# Patient Record
Sex: MALE | Race: ASIAN | URBAN - METROPOLITAN AREA
[De-identification: names, ages, dates, MRNs, and addresses within clinical notes are randomized per-mention and may not be internally consistent; named-entity substitution may affect disease eponyms.]

---

## 2017-03-09 ENCOUNTER — TELEPHONE (OUTPATIENT)
Dept: FAMILY MEDICINE | Facility: CLINIC | Age: 57
End: 2017-03-09

## 2017-03-09 NOTE — LETTER
[address] [address]  [phone]  Dept: [phone]      March 9, 2017      True Fatimah  [address]   [facility] [address]    Dear Rajesh Sheridan,     At [facility] we care about your health and are committed to providing quality patient care.    Which includes staying current on preventive cancer screenings.  You can increase your chances of finding and treating cancers through regular screenings.      Our records indicate you may be due for the following preventive screening(s):    Colonoscopy    Colonoscopy is recommended every ten years for everyone age 50 and older. We strongly urge our patient's to consider having a colonoscopy done, which is the best screening test available and only needs to be done every 10 years if normal. If you are unwilling or unable to have a colonoscopy then we recommend the annual stool testing for blood. This test is called a FIT test and it looks for blood in the stool.     To schedule an appointment or discuss this screening further, you may contact us by phone at the [facility] at [phone] or online through the patient [email or web address] @ [email or web address]    If you have had any of the screenings listed above at another facility, please call us so that we may update your chart.      Your partners in health,      Quality Committee at [facility]

## 2017-05-18 ENCOUNTER — TELEPHONE (OUTPATIENT)
Dept: FAMILY MEDICINE | Facility: CLINIC | Age: 57
End: 2017-05-18

## 2017-05-18 NOTE — TELEPHONE ENCOUNTER
Panel Management Review      3/9/2017    Fail List measure: Colon      Patient is due/failing the following:   COLONOSCOPY    Action needed:   Needs to schedule a colonoscopy    Type of outreach:    Sent letter.    Questions for provider review:    None                                                                                                                                    Shannan Estevez

## 2017-05-18 NOTE — LETTER
[facility]  [address] [place] [address]  [phone]      May 18, 2017      True Fatimah  [address]   [place] [facility] [address]              Dear True,    Our records show that you have not yet completed your colonoscopy for your colon cancer screening.  Please call to schedule your colonoscopy.    If you have already completed the colonoscopy please contact us so we can obtain the records and update your chart.    Sincerely,    Ciriol Goins MD                Procedure/Referral: PROCEDURE ONLY - COLONOSCOPY - Reason for procedure: Screening  N: [facility] [phone]

## 2017-05-31 ENCOUNTER — OFFICE VISIT (OUTPATIENT)
Dept: FAMILY MEDICINE | Facility: CLINIC | Age: 57
End: 2017-05-31
Payer: COMMERCIAL

## 2017-05-31 VITALS
HEIGHT: 63 IN | BODY MASS INDEX: 28.35 KG/M2 | SYSTOLIC BLOOD PRESSURE: 130 MMHG | OXYGEN SATURATION: 99 % | TEMPERATURE: 98.4 F | WEIGHT: 160 LBS | DIASTOLIC BLOOD PRESSURE: 86 MMHG | HEART RATE: 69 BPM

## 2017-05-31 DIAGNOSIS — N48.89 PENILE MASS: Primary | ICD-10-CM

## 2017-05-31 DIAGNOSIS — Z12.11 SCREEN FOR COLON CANCER: ICD-10-CM

## 2017-05-31 PROCEDURE — 99213 OFFICE O/P EST LOW 20 MIN: CPT | Performed by: INTERNAL MEDICINE

## 2017-05-31 NOTE — PROGRESS NOTES
SUBJECTIVE:                                                    Rajesh Sheridan is a 57 year old male who presents to clinic today for the following health issues:    Penile mass      Duration: chronic (approximately 2 years)    Description (location/character/radiation): palpable mass in penile shaft, which is due to previous injection treatment the patient to increase diameter of his penile shaft.    Accompanying signs and symptoms: Denies pain during intercourse or urethral discharge    History (similar episodes/previous evaluation): none    Alleviating factors: none    Therapies tried and outcome: none       Problem list and histories reviewed & adjusted, as indicated.  Additional history: as documented    Patient Active Problem List   Diagnosis     Overweight (BMI 25.0-29.9)     Hyperlipidemia LDL goal <130     History reviewed. No pertinent surgical history.    Social History   Substance Use Topics     Smoking status: Never Smoker     Smokeless tobacco: Not on file     Alcohol use No     History reviewed. No pertinent family history.      No Known Allergies  Recent Labs   Lab Test  07/14/16   0816   LDL  108*   HDL  51   TRIG  159*   ALT  32   CR  0.89   GFRESTIMATED  89   GFRESTBLACK  >90   GFR Calc     POTASSIUM  4.2      BP Readings from Last 3 Encounters:   05/31/17 130/86   07/14/16 130/83    Wt Readings from Last 3 Encounters:   05/31/17 160 lb (72.6 kg)   07/14/16 160 lb (72.6 kg)                    ROS:  C: NEGATIVE for fever, chills, change in weight  I: NEGATIVE for worrisome rashes, moles or lesions  E: NEGATIVE for vision changes or irritation  E/M: NEGATIVE for ear, mouth and throat problems  R: NEGATIVE for significant cough or SOB  CV: NEGATIVE for chest pain, palpitations or peripheral edema  GI: NEGATIVE for nausea, abdominal pain, heartburn, or change in bowel habits Patient is due for colon cancer screening.  : NEGATIVE for frequency, dysuria, or hematuria  M: NEGATIVE for  "significant arthralgias or myalgia  N: NEGATIVE for weakness, dizziness or paresthesias  E: NEGATIVE for temperature intolerance, skin/hair changes  H: NEGATIVE for bleeding problems  P: NEGATIVE for changes in mood or affect    OBJECTIVE:                                                    /86  Pulse 69  Temp 98.4  F (36.9  C) (Oral)  Ht 5' 3\" (1.6 m)  Wt 160 lb (72.6 kg)  SpO2 99%  BMI 28.34 kg/m2  Body mass index is 28.34 kg/(m^2).  GENERAL: healthy, alert and no distress  EYES: Eyes grossly normal to inspection  NECK: no adenopathy  CV: regular rate and rhythm, normal S1 S2, no S3 or S4, no murmur, click or rub, no peripheral edema and peripheral pulses strong  ABDOMEN: soft, nontender, no hepatosplenomegaly, no masses and bowel sounds normal   (male): Palpable mass in the distal penile shaft that is non-tender. No urethral discharge, no hernia  MS: no gross musculoskeletal defects noted, no edema  SKIN: no suspicious lesions or rashes  NEURO: Normal strength and tone, mentation intact and speech normal  PSYCH: mentation appears normal, affect normal/bright    Diagnostic Test Results:  none      ASSESSMENT/PLAN:                                                            (N48.9) Penile mass  (primary encounter diagnosis)  Comment: Secondary to previous \"injectable\" treatment received by the patient in an effort to enlarge his penile shaft many years ago.  NO secondary infection or pain during intercourse.  Plan: UROLOGY ADULT REFERRAL            (Z12.11) Screen for colon cancer  Comment: Due for test.  Plan: Fecal colorectal cancer screen FIT - Future         (S+30)              Follow-up visit if condition worsens.    Cirilo Goins MD  [facility]  "

## 2017-05-31 NOTE — MR AVS SNAPSHOT
After Visit Summary   5/31/2017    Rajesh Sheridan    MRN: [id number]           Patient Information     Date Of Birth          1960        Visit Information        Provider Department      5/31/2017 8:20 AM Cirilo Goisn MD [facility]        Today's Diagnoses     Screen for colon cancer    -  1    Penile lesion          Care Instructions    This summary includes the important diagnoses, test, medications and other important parts of your medical history.  Below are a few good we sites you can use to learn more about these.     Www.NeoScale Systems.RadioRx : Up to date and easily searchable information on multiple topics.  Www.[facility] Center[facility]iv[facility] Therapeutics.RadioRx/Pharmacy/c_539084.asp : [facility] $4.99 medications  Www.Miraculins.gov : medication info, interactive tutorials, watch real surgeries online  Www.familydoctor.org : good info from the Academy of Family Physicians  Www.SkuRun : good info from the [facility]  Www.cdc.gov : public health info, travel advisories, epidemics (H1N1)  Www.aap.org : children's health info, normal development, vaccinations  Www.health.[facility].[place].us : [place] dept of heatlh, public health issues in [place], N1N1    Based on your medical history and these are the current health maintenance or preventive care services that you are due for (some may have been done at this visit:)  Health Maintenance Due   Topic Date Due     ADVANCE DIRECTIVE PLANNING Q5 YRS  02/05/1980     COLON CANCER SCREEN (SYSTEM ASSIGNED)  02/05/2012     =================================================================================  Normal Values   Blood pressure  <140/90 for most adults    <130/80 for some chronic diseases (ask your care team about yours)    BMI (body mass index)  18.5-25 kg/m2 (based on height and weight)     Thank you for visiting [facility]    Normal or non-critical lab and imaging results will be communicated to you by MyChart, letter or phone within 7 days.   If you do not hear from us within 10 days, please call the clinic. If you have a critical or abnormal lab result, we will notify you by phone as soon as possible.     If you have any questions regarding your visit please contact:     Team Comfort:   Clinic Hours Telephone Number   Dr. Zelalem Pereyra  [place]   7am-5pm  Monday - Friday [phone]  [facility]   Pharmacy 8am-8pm Monday-Thursday      8am-6pm Friday  9am-5pm Saturday-Sunday [phone]   Urgent Care 11am-8pm Monday-Friday        9am-5pm Saturday-Sunday [phone]     After hours, weekend or if you need to make an appointment with your primary provider please call [phone].   After Hours nurse advise: call [facility]: [phone]    Medication Refills:  Call your pharmacy and they will forward the refill to us. Please allow 3 business days for your refills to be completed.    Use [employer] (secure email communication and access to your chart) to send your primary care provider a message or make an appointment. Ask someone on your Team how to sign up for [employer]. To log on to [employer] or for more information in [place] please visit the website at [email or web address].  As of October 8, 2013, all password changes, disabled accounts, or ID changes in Assurex Health/MyHealth will be done by our Access Services Department.   If you need help with your account or password, call: [phone]. Clinic staff no longer has the ability to change passwords.             Follow-ups after your visit        Additional Services     UROLOGY ADULT REFERRAL       Your provider has referred you to: [facility] [phone]   [email or web address]    Please be aware that coverage of these services is subject to the terms and limitations of your health insurance plan.  Call member services at your health plan with any benefit or coverage  "questions.      Please bring the following with you to your appointment:    (1) Any X-Rays, CTs or MRIs which have been performed.  Contact the facility where they were done to arrange for  prior to your scheduled appointment.    (2) List of current medications  (3) This referral request   (4) Any documents/labs given to you for this referral                  Follow-up notes from your care team     Return in about 6 weeks (around 7/12/2017).      Future tests that were ordered for you today     Open Future Orders        Priority Expected Expires Ordered    UROLOGY ADULT REFERRAL Routine  5/31/2018 5/31/2017    Fecal colorectal cancer screen FIT - Future (S+30) Routine 6/21/2017 6/30/2017 5/31/2017            Who to contact     If you have questions or need follow up information about today's clinic visit or your schedule please contact [facility] directly at [phone].  Normal or non-critical lab and imaging results will be communicated to you by MyChart, letter or phone within 4 business days after the clinic has received the results. If you do not hear from us within 7 days, please contact the clinic through Tidy Bookshart or phone. If you have a critical or abnormal lab result, we will notify you by phone as soon as possible.  Submit refill requests through CloudPay or call your pharmacy and they will forward the refill request to us. Please allow 3 business days for your refill to be completed.          Additional Information About Your Visit        CloudPay Information     CloudPay lets you send messages to your doctor, view your test results, renew your prescriptions, schedule appointments and more. To sign up, go to [email or web address] . Click on \"Log in\" on the left side of the screen, which will take you to the Welcome page. Then click on \"Sign up Now\" on the right side of the page.     You will be asked to enter the access code listed below, as well as some personal information. " "Please follow the directions to create your username and password.     Your access code is: [email or web address]  Expires: 2017  9:33 AM     Your access code will  in 90 days. If you need help or a new code, please call your [facility] clinic or [phone].        Care EveryWhere ID     This is your Care EveryWhere ID. This could be used by other organizations to access your [facility] medical records  [id number]        Your Vitals Were     Pulse Temperature Height Pulse Oximetry BMI (Body Mass Index)       69 98.4  F (36.9  C) (Oral) 5' 3\" (1.6 m) 99% 28.34 kg/m2        Blood Pressure from Last 3 Encounters:   17 130/86   16 130/83    Weight from Last 3 Encounters:   17 160 lb (72.6 kg)   16 160 lb (72.6 kg)               Primary Care Provider Office Phone # Fax #    Cirilo Goins [phone]       [facility] [address] [facility] [address]        Thank you!     Thank you for choosing [facility]  for your care. Our goal is always to provide you with excellent care. Hearing back from our patients is one way we can continue to improve our services. Please take a few minutes to complete the written survey that you may receive in the mail after your visit with us. Thank you!             Your Updated Medication List - Protect others around you: Learn how to safely use, store and throw away your medicines at www.disposemymeds.org.      Notice  As of 2017  9:33 AM    You have not been prescribed any medications.      "

## 2017-05-31 NOTE — NURSING NOTE
"Chief Complaint   Patient presents with     RECHECK     health recheck       Initial BP (!) 150/92 (BP Location: Left arm, Patient Position: Chair, Cuff Size: Adult Regular)  Pulse 69  Temp 98.4  F (36.9  C) (Oral)  Ht 5' 3\" (1.6 m)  Wt 160 lb (72.6 kg)  SpO2 99%  BMI 28.34 kg/m2 Estimated body mass index is 28.34 kg/(m^2) as calculated from the following:    Height as of this encounter: 5' 3\" (1.6 m).    Weight as of this encounter: 160 lb (72.6 kg).  Medication Reconciliation: complete     Nathaniel Spaulding MA      "

## 2017-05-31 NOTE — PATIENT INSTRUCTIONS
This summary includes the important diagnoses, test, medications and other important parts of your medical history.  Below are a few good we sites you can use to learn more about these.     Www.Continuity Software.org : Up to date and easily searchable information on multiple topics.  Www.Continuity Software.org/Pharmacy/c_539084.asp : [facility] $4.99 medications  Www.medlineplus.gov : medication info, interactive tutorials, watch real surgeries online  Www.familydoctor.org : good info from the Academy of Family Physicians  Www.mayoArtimplant ABinic.com : good info from the [facility]  Www.cdc.gov : public health info, travel advisories, epidemics (H1N1)  Www.aap.org : children's health info, normal development, vaccinations  Www.health.[facility].mn.us : [place] dept of heat, public health issues in [place], N1N1    Based on your medical history and these are the current health maintenance or preventive care services that you are due for (some may have been done at this visit:)  Health Maintenance Due   Topic Date Due     ADVANCE DIRECTIVE PLANNING Q5 YRS  02/05/1980     COLON CANCER SCREEN (SYSTEM ASSIGNED)  02/05/2012     =================================================================================  Normal Values   Blood pressure  <140/90 for most adults    <130/80 for some chronic diseases (ask your care team about yours)    BMI (body mass index)  18.5-25 kg/m2 (based on height and weight)     Thank you for visiting [facility]    Normal or non-critical lab and imaging results will be communicated to you by MyChart, letter or phone within 7 days.  If you do not hear from us within 10 days, please call the clinic. If you have a critical or abnormal lab result, we will notify you by phone as soon as possible.     If you have any questions regarding your visit please contact:     Team Comfort:   Clinic Hours Telephone Number   Dr. Zelalem Solis    7am-5pm  Monday - Friday [phone]  [facility]   Pharmacy 8am-8pm Monday-Thursday      8am-6pm Friday  9am-5pm Saturday-Sunday [phone]   Urgent Care 11am-8pm Monday-Friday        9am-5pm Saturday-Sunday [phone]     After hours, weekend or if you need to make an appointment with your primary provider please call [phone].   After Hours nurse advise: call [facility]: [phone]    Medication Refills:  Call your pharmacy and they will forward the refill to us. Please allow 3 business days for your refills to be completed.    Use Roundscapes (secure email communication and access to your chart) to send your primary care provider a message or make an appointment. Ask someone on your Team how to sign up for Roundscapes. To log on to Insignia Technologies or for more information in Mov[employer]u please visit the website at [email or web address].  As of October 8, 2013, all password changes, disabled accounts, or ID changes in Roundscapes/MyHealth will be done by our Access Services Department.   If you need help with your account or password, call: [phone]. Clinic staff no longer has the ability to change passwords.

## 2017-06-06 DIAGNOSIS — Z12.11 SCREEN FOR COLON CANCER: ICD-10-CM

## 2017-06-06 LAB — HEMOCCULT STL QL IA: NEGATIVE

## 2017-06-06 PROCEDURE — 82274 ASSAY TEST FOR BLOOD FECAL: CPT | Performed by: INTERNAL MEDICINE

## 2017-06-19 ENCOUNTER — OFFICE VISIT (OUTPATIENT)
Dept: UROLOGY | Facility: CLINIC | Age: 57
End: 2017-06-19
Payer: COMMERCIAL

## 2017-06-19 VITALS — HEART RATE: 60 BPM | RESPIRATION RATE: 10 BRPM | SYSTOLIC BLOOD PRESSURE: 118 MMHG | DIASTOLIC BLOOD PRESSURE: 70 MMHG

## 2017-06-19 DIAGNOSIS — N48.89 PENILE MASS: ICD-10-CM

## 2017-06-19 PROCEDURE — 99243 OFF/OP CNSLTJ NEW/EST LOW 30: CPT | Performed by: UROLOGY

## 2017-06-19 NOTE — PROGRESS NOTES
SUBJECTIVE:  Janneth Sheridan is a pleasant 57-year-old male who was requested to be seen by Dr. Cirilo Goins for a consultation with regard to patient's penile mass.  The patient said that he has injected some treatment of his penile mass a number of years ago.  It now is causing him some concern given that it has become disfigured.  He has no problem with sexual activities or any urination issues.      ASSESSMENT:  A 57-year-old gentleman with penile mass after previous injectable treatment for penile enlargement.      RECOMMENDATION:  The patient is interested in excision of this mass.  Risks and benefits discussed with the patient at length.  I told him that this is sometimes quite difficult to excise given its size and also whether we have enough foreskin to cover the defect after excision.  He might need more than 1 procedure to fix this issue.  The patient understands and wants to proceed with the treatment.         ADIA DAVIS MD             D: 2017 08:16   T: 2017 08:50   MT: MARYJO      Name:     JANNETH SHERIDAN   MRN:      [id number]        Account:      [id number]   :      1960           Visit Date:   2017      Document: [id number]       cc: Cirilo Goins MD

## 2017-06-19 NOTE — MR AVS SNAPSHOT
"              After Visit Summary   2017    Rajesh Sheridan    MRN: [id number]           Patient Information     Date Of Birth          1960        Visit Information        Provider Department      2017 8:00 AM Khurram Haddad MD [facility]        Today's Diagnoses     Penile mass           Follow-ups after your visit        Your next 10 appointments already scheduled     2017   Procedure with Khurram Haddad MD   [facility] [place] (--)    [address] [place] [address] [phone]              Who to contact     If you have questions or need follow up information about today's clinic visit or your schedule please contact [facility] directly at [phone].  Normal or non-critical lab and imaging results will be communicated to you by MyChart, letter or phone within 4 business days after the clinic has received the results. If you do not hear from us within 7 days, please contact the clinic through MyChart or phone. If you have a critical or abnormal lab result, we will notify you by phone as soon as possible.  Submit refill requests through Active Mind Technology or call your pharmacy and they will forward the refill request to us. Please allow 3 business days for your refill to be completed.          Additional Information About Your Visit        MyCharGL 2ours Information     Active Mind Technology lets you send messages to your doctor, view your test results, renew your prescriptions, schedule appointments and more. To sign up, go to [email or web address] . Click on \"Log in\" on the left side of the screen, which will take you to the Welcome page. Then click on \"Sign up Now\" on the right side of the page.     You will be asked to enter the access code listed below, as well as some personal information. Please follow the directions to create your username and password.     Your access code is: [email or web address]  Expires: 2017  9:33 AM     Your access code will  in " 90 days. If you need help or a new code, please call your [facility] clinic or [phone].        Care EveryWhere ID     This is your Care EveryWhere ID. This could be used by other organizations to access your [facility] medical records  [id number]        Your Vitals Were     Pulse Respirations                60 10           Blood Pressure from Last 3 Encounters:   06/19/17 118/70   05/31/17 130/86   07/14/16 130/83    Weight from Last 3 Encounters:   05/31/17 72.6 kg (160 lb)   07/14/16 72.6 kg (160 lb)              We Performed the Following     UROLOGY ADULT REFERRAL        Primary Care Provider Office Phone # Fax #    Cirilo Goins [phone]       [facility] [address] [facility] [address]        Thank you!     Thank you for choosing [facility]  for your care. Our goal is always to provide you with excellent care. Hearing back from our patients is one way we can continue to improve our services. Please take a few minutes to complete the written survey that you may receive in the mail after your visit with us. Thank you!             Your Updated Medication List - Protect others around you: Learn how to safely use, store and throw away your medicines at www.disposemymeds.org.      Notice  As of 6/19/2017  8:25 AM    You have not been prescribed any medications.

## 2017-06-19 NOTE — NURSING NOTE
"Chief Complaint   Patient presents with     Consult       Initial /70 (BP Location: Right arm, Patient Position: Chair, Cuff Size: Adult Regular)  Pulse 60  Resp 10 Estimated body mass index is 28.34 kg/(m^2) as calculated from the following:    Height as of 5/31/17: 1.6 m (5' 3\").    Weight as of 5/31/17: 72.6 kg (160 lb).  Medication Reconciliation: complete   Ama Muñoz RN       "

## 2017-06-19 NOTE — PROGRESS NOTES
S: See dictated note   No current outpatient prescriptions on file.     No Known Allergies  No past medical history on file.  No past surgical history on file.   No family history on file.  Social History     Social History     Marital status:      Spouse name: N/A     Number of children: N/A     Years of education: N/A     Social History Main Topics     Smoking status: Never Smoker     Smokeless tobacco: None     Alcohol use No     Drug use: No     Sexual activity: Yes     Partners: Female     Other Topics Concern     None     Social History Narrative       REVIEW OF SYSTEMS  =================  C: NEGATIVE for fever, chills, change in weight  I: NEGATIVE for worrisome rashes, moles or lesions  E/M: NEGATIVE for ear, mouth and throat problems  R: NEGATIVE for significant cough or SHORTNESS OF BREATH  CV:  NEGATIVE for chest pain, palpitations or peripheral edema  GI: NEGATIVE for nausea, abdominal pain, heartburn, or change in bowel habits  NEURO: NEGATIVE numbness/weakness  : see HPI  PSYCH: NEGATIVE depression/anxiety  LYmph: no new enlarged lymph nodes  Ortho: no new trauma/movements      Physical Exam:  /70 (BP Location: Right arm, Patient Position: Chair, Cuff Size: Adult Regular)  Pulse 60  Resp 10   Patient is pleasant, in no acute distress, good general condition.  HEENT:  Normalcephalic, atraumatic  Lung: no evidence of respiratory distress    Abdomen: Soft, nondistended, non tender. No masses. No rebound or guarding.   Exam: penis with large penile lass distally.  No penile discharge.  Testis no masses.  No scrotal skin  Lesion.  Skin: Warm and dry.  No redness.  Neuro: grossly normal  Psych normal mood and affect  Musculoskeletal  moving all extremities    Assessment/Plan:   See dictated note

## 2017-07-06 ENCOUNTER — OFFICE VISIT (OUTPATIENT)
Dept: FAMILY MEDICINE | Facility: CLINIC | Age: 57
End: 2017-07-06
Payer: COMMERCIAL

## 2017-07-06 VITALS
TEMPERATURE: 97.4 F | OXYGEN SATURATION: 97 % | SYSTOLIC BLOOD PRESSURE: 122 MMHG | DIASTOLIC BLOOD PRESSURE: 81 MMHG | HEART RATE: 83 BPM | WEIGHT: 160 LBS | BODY MASS INDEX: 28.35 KG/M2 | HEIGHT: 63 IN

## 2017-07-06 DIAGNOSIS — R73.03 PREDIABETES: ICD-10-CM

## 2017-07-06 DIAGNOSIS — Z01.818 PREOP GENERAL PHYSICAL EXAM: Primary | ICD-10-CM

## 2017-07-06 DIAGNOSIS — R94.31 ABNORMAL ELECTROCARDIOGRAM: ICD-10-CM

## 2017-07-06 DIAGNOSIS — Z13.6 CARDIOVASCULAR SCREENING; LDL GOAL LESS THAN 130: ICD-10-CM

## 2017-07-06 PROBLEM — N48.89 PENILE MASS: Status: ACTIVE | Noted: 2017-07-06

## 2017-07-06 LAB
ALBUMIN SERPL-MCNC: 4 G/DL (ref 3.4–5)
ALP SERPL-CCNC: 65 U/L (ref 40–150)
ALT SERPL W P-5'-P-CCNC: 33 U/L (ref 0–70)
ANION GAP SERPL CALCULATED.3IONS-SCNC: 7 MMOL/L (ref 3–14)
AST SERPL W P-5'-P-CCNC: 21 U/L (ref 0–45)
BASOPHILS # BLD AUTO: 0 10E9/L (ref 0–0.2)
BASOPHILS NFR BLD AUTO: 0.3 %
BILIRUB SERPL-MCNC: 0.7 MG/DL (ref 0.2–1.3)
BUN SERPL-MCNC: 24 MG/DL (ref 7–30)
CALCIUM SERPL-MCNC: 9.1 MG/DL (ref 8.5–10.1)
CHLORIDE SERPL-SCNC: 106 MMOL/L (ref 94–109)
CHOLEST SERPL-MCNC: 199 MG/DL
CO2 SERPL-SCNC: 26 MMOL/L (ref 20–32)
CREAT SERPL-MCNC: 0.92 MG/DL (ref 0.66–1.25)
DIFFERENTIAL METHOD BLD: NORMAL
EOSINOPHIL # BLD AUTO: 0.3 10E9/L (ref 0–0.7)
EOSINOPHIL NFR BLD AUTO: 3.3 %
ERYTHROCYTE [DISTWIDTH] IN BLOOD BY AUTOMATED COUNT: 12.7 % (ref 10–15)
GFR SERPL CREATININE-BSD FRML MDRD: 86 ML/MIN/1.7M2
GLUCOSE SERPL-MCNC: 107 MG/DL (ref 70–99)
HCT VFR BLD AUTO: 45.4 % (ref 40–53)
HDLC SERPL-MCNC: 48 MG/DL
HGB BLD-MCNC: 15.4 G/DL (ref 13.3–17.7)
INR PPP: 1 (ref 0.86–1.14)
LDLC SERPL CALC-MCNC: 114 MG/DL
LYMPHOCYTES # BLD AUTO: 2.7 10E9/L (ref 0.8–5.3)
LYMPHOCYTES NFR BLD AUTO: 35.3 %
MCH RBC QN AUTO: 31 PG (ref 26.5–33)
MCHC RBC AUTO-ENTMCNC: 33.9 G/DL (ref 31.5–36.5)
MCV RBC AUTO: 91 FL (ref 78–100)
MONOCYTES # BLD AUTO: 0.8 10E9/L (ref 0–1.3)
MONOCYTES NFR BLD AUTO: 10 %
NEUTROPHILS # BLD AUTO: 3.8 10E9/L (ref 1.6–8.3)
NEUTROPHILS NFR BLD AUTO: 51.1 %
NONHDLC SERPL-MCNC: 151 MG/DL
PLATELET # BLD AUTO: 172 10E9/L (ref 150–450)
POTASSIUM SERPL-SCNC: 4.4 MMOL/L (ref 3.4–5.3)
PROT SERPL-MCNC: 8.4 G/DL (ref 6.8–8.8)
RBC # BLD AUTO: 4.97 10E12/L (ref 4.4–5.9)
SODIUM SERPL-SCNC: 139 MMOL/L (ref 133–144)
TRIGL SERPL-MCNC: 184 MG/DL
WBC # BLD AUTO: 7.5 10E9/L (ref 4–11)

## 2017-07-06 PROCEDURE — 99214 OFFICE O/P EST MOD 30 MIN: CPT | Performed by: INTERNAL MEDICINE

## 2017-07-06 PROCEDURE — 80053 COMPREHEN METABOLIC PANEL: CPT | Performed by: INTERNAL MEDICINE

## 2017-07-06 PROCEDURE — 85610 PROTHROMBIN TIME: CPT | Performed by: INTERNAL MEDICINE

## 2017-07-06 PROCEDURE — 80061 LIPID PANEL: CPT | Performed by: INTERNAL MEDICINE

## 2017-07-06 PROCEDURE — 86141 C-REACTIVE PROTEIN HS: CPT | Performed by: INTERNAL MEDICINE

## 2017-07-06 PROCEDURE — 36415 COLL VENOUS BLD VENIPUNCTURE: CPT | Performed by: INTERNAL MEDICINE

## 2017-07-06 PROCEDURE — 93000 ELECTROCARDIOGRAM COMPLETE: CPT | Performed by: INTERNAL MEDICINE

## 2017-07-06 PROCEDURE — 85025 COMPLETE CBC W/AUTO DIFF WBC: CPT | Performed by: INTERNAL MEDICINE

## 2017-07-06 ASSESSMENT — PAIN SCALES - GENERAL: PAINLEVEL: NO PAIN (0)

## 2017-07-06 NOTE — MR AVS SNAPSHOT
After Visit Summary   7/6/2017    Rajesh Sheridan    MRN: [id number]           Patient Information     Date Of Birth          1960        Visit Information        Provider Department      7/6/2017 8:00 AM Cirilo Goins MD [facility]        Today's Diagnoses     Preop general physical exam    -  1    CARDIOVASCULAR SCREENING; LDL GOAL LESS THAN 130        Abnormal electrocardiogram          Care Instructions      Before Your Surgery      Call your surgeon if there is any change in your health. This includes signs of a cold or flu (such as a sore throat, runny nose, cough, rash or fever).    Do not smoke, drink alcohol or take over the counter medicine (unless your surgeon or primary care doctor tells you to) for the 24 hours before and after surgery.    If you take prescribed drugs: Follow your doctor s orders about which medicines to take and which to stop until after surgery.    Eating and drinking prior to surgery: follow the instructions from your surgeon    Take a shower or bath the night before surgery. Use the soap your surgeon gave you to gently clean your skin. If you do not have soap from your surgeon, use your regular soap. Do not shave or scrub the surgery site.  Wear clean pajamas and have clean sheets on your bed.         ================================================================================  Normal Values   Blood pressure  <140/90 for most adults    <130/80 for some chronic diseases (ask your care team about yours)    BMI (body mass index)  18.5-25 kg/m2 (based on height and weight)     Thank you for visiting [facility]    Normal or non-critical lab and imaging results will be communicated to you by MyChart, letter or phone within 7 days.  If you do not hear from us within 10 days, please call the clinic. If you have a critical or abnormal lab result, we will notify you by phone as soon as possible.     If you have any questions  regarding your visit please contact:     Team Comfort:   Clinic Hours Telephone Number   Dr. Zelalem Goins 7am-5pm  Monday - Friday [phone]  Ninoska Kauffman RN  Kiara RN   Pharmacy 8:00am-8pm Monday-Friday    9am-5pm Saturday-Sunday [phone]   Urgent Care 11am-9pm Monday-Friday        9am-5pm Saturday-Sunday [phone]     After hours, weekend or if you need to make an appointment with your primary provider please call [phone].   After Hours nurse advise: call [facility]: [phone]    Medication Refills:  Call your pharmacy and they will forward the refill to us. Please allow 3 business days for your refills to be completed.          Chest Echocardiography (Transthoracic)     During an echo, images of your heart appear on a monitor.   An echocardiogram (echo) is an imaging test.  A transthoracic echocardiogram is sometimes called by its abbreviation TTE. It may also be called surface echocardiogram because the images are non-invasive taken from the surface of the chest wall. It helps your healthcare provider evaluate your heart. A more invasive type of echocardiogram involves the ultrasound probe being passed into the esophagus to get images (transesophageal).     This test:    Is safe and generally painless. Some people have discomfort from the echo probe being pressed against the bony areas of the chest. This is relieves once the probe is moved.    Can be done in a hospital, test center, or doctor s office    Bounces harmless sound waves (ultrasound) off the heart using a transducer or probe (device that looks like a microphone)    Allows your healthcare provider evaluate the size and shape of your heart, and the size, thickness and movement of your heart's walls, and the heart's pumping strength.    Shows if the heart valves are working correctly, if blood is leaking backwards through your heart valves  (regurgitation), or if the heart valves are to onarrow (stenosis)    Shows if there is a tumor or infectious growth around your heart valves    Will help your healthcare provider find out if there are problems with the outer lining of your heart (pericardium)    Shows problems with the large blood vessels that enter and leave the heart    Demonstrates blood clots in the heart chambers    Shows abnormal holes between heart chambers  Before your echo    Discuss any questions or concerns you have with your healthcare provider.    Mention any over-the-counter or prescription medicines, herbs, or supplements you re taking.    Allow extra time for checking in. Bring your insurance cards, identification, and any co-payments that are required for the test.    Wear a 2-piece outfit for the test. You may be asked to remove clothing and jewelry from the waist up. If so, you ll be given a short hospital gown.    An intravenous catheter may be inserted into a vein in your arm or hand. Contrast or bubbles will be injected during the study.  During your echo    Small pads (electrodes) are placed on your chest to monitor your heartbeat.    A transducer coated with cool gel is moved firmly over your chest. This device creates the sound waves that make images of your heart. If you are overweight, the technician may have to apply more pressure to the chest wall to improve the quality of the images. This pressure can be uncomfortable over bony areas. Tell your technician if you are uncomfortable.    At times, you may be asked to exhale and hold your breath for a few seconds. Air in your lungs can affect the images.    The transducer may also be used to do a Doppler study. This test measures the direction and speed of blood flowing through the heart. During the test, you may hear a  whooshing  sound. This is the sound of blood flowing through the heart.    The technician may use IV contrast to improve the image quality or agitated  saline may be used to follow blood flow through the chambers of the heart.    The images of your heart are stored electronically. This is so your healthcare provider can review them later.  After your echo    Return to normal activity unless your healthcare provider tells you otherwise.    Be sure to keep follow-up appointments.  Your test results  Your healthcare provider will discuss your test results with you during a future office visit. The test results help the healthcare provider plan your treatment and any other tests that are needed.  Date Last Reviewed: 12/1/2016 2000-2017 [employer]. [address] [place] [address]. All rights reserved. This information is not intended as a substitute for professional medical care. Always follow your healthcare professional's instructions.                Follow-ups after your visit        Your next 10 appointments already scheduled     Jul 13, 2017   Procedure with Khurram Haddad MD   [facility] (--)    [address]  [facility] [id number] [phone]              Future tests that were ordered for you today     Open Future Orders        Priority Expected Expires Ordered    Echocardiogram Complete Routine  7/6/2018 7/6/2017            Who to contact     If you have questions or need follow up information about today's clinic visit or your schedule please contact [facility] directly at [phone].  Normal or non-critical lab and imaging results will be communicated to you by MyChart, letter or phone within 4 business days after the clinic has received the results. If you do not hear from us within 7 days, please contact the clinic through MyChart or phone. If you have a critical or abnormal lab result, we will notify you by phone as soon as possible.  Submit refill requests through [facility] or call your pharmacy and they will forward the refill request to us. Please allow 3 business days for  "your refill to be completed.          Additional Information About Your Visit        [email or web address] Information     [employer] lets you send messages to your doctor, view your test results, renew your prescriptions, schedule appointments and more. To sign up, go to [email or web address] . Click on \"Log in\" on the left side of the screen, which will take you to the Welcome page. Then click on \"Sign up Now\" on the right side of the page.     You will be asked to enter the access code listed below, as well as some personal information. Please follow the directions to create your username and password.     Your access code is: [email or web address]  Expires: 2017  9:33 AM     Your access code will  in 90 days. If you need help or a new code, please call your [facility] clinic or [phone].        Care EveryWhere ID     This is your [facility] EveryWhere ID. This could be used by other organizations to access your [facility] medical records  [id number]        Your Vitals Were     Pulse Temperature Height Pulse Oximetry BMI (Body Mass Index)       83 97.4  F (36.3  C) (Oral) 5' 3\" (1.6 m) 97% 28.34 kg/m2        Blood Pressure from Last 3 Encounters:   17 122/81   17 118/70   17 130/86    Weight from Last 3 Encounters:   17 160 lb (72.6 kg)   17 160 lb (72.6 kg)   16 160 lb (72.6 kg)              We Performed the Following     CBC with platelets and differential     Comprehensive metabolic panel (BMP + Alb, Alk Phos, ALT, AST, Total. Bili, TP)     CRP cardiac risk     EKG 12-lead complete w/read - Clinics     INR     Lipid Profile (Chol, Trig, HDL, LDL calc)        Primary Care Provider Office Phone # Fax #    Cirilo Goins [phone]       [facility] [address]  [facility] [address]        Equal Access to Services     ALEXA LESLIE AH: Erik Montelongo, anupama allen, qaybta chelly casillas" ah. So [facility] [phone].    ATENCIÓN: Si habla iain, tiene a ye disposición servicios gratuitos de asistencia lingüística. Zuri al [phone].    We comply with applicable federal civil rights laws and [place] laws. We do not discriminate on the basis of race, color, national origin, age, disability sex, sexual orientation or gender identity.            Thank you!     Thank you for choosing [facility]  for your care. Our goal is always to provide you with excellent care. Hearing back from our patients is one way we can continue to improve our services. Please take a few minutes to complete the written survey that you may receive in the mail after your visit with us. Thank you!             Your Updated Medication List - Protect others around you: Learn how to safely use, store and throw away your medicines at www.disposemymeds.org.      Notice  As of 7/6/2017  8:18 AM    You have not been prescribed any medications.

## 2017-07-06 NOTE — PROGRESS NOTES
[address] [address]  [phone]  Dept: [phone]    PRE-OP EVALUATION:  Today's date: 2017    Rajesh Sheridan (: 1960) presents for pre-operative evaluation assessment as requested by Dr. Haddad.  He requires evaluation and anesthesia risk assessment prior to undergoing surgery/procedure for penile mass excision.    Date of Surgery/ Procedure: 17  Time of Surgery/ Procedure: 12:15 pm.  Hospital/Surgical Facility: [facility]   Fax number for surgical facility:   Primary Physician: Cirilo Goins MD  Type of Anesthesia Anticipated: to be determined    Patient has a Health Care Directive or Living Will:  NO    1. NO - Do you have a history of heart attack, stroke, stent, bypass or surgery on an artery in the head, neck, heart or legs?  2. NO - Do you ever have any pain or discomfort in your chest?  3. NO - Do you have a history of  Heart Failure?  4. NO - Are you troubled by shortness of breath when: walking on the level, up a slight hill or at night?  5. NO - Do you currently have a cold, bronchitis or other respiratory infection?  6. NO - Do you have a cough, shortness of breath or wheezing?  7. NO - Do you sometimes get pains in the calves of your legs when you walk?  8. NO - Do you or anyone in your family have previous history of blood clots?  9. NO - Do you or does anyone in your family have a serious bleeding problem such as prolonged bleeding following surgeries or cuts?  10. NO - Have you ever had problems with anemia or been told to take iron pills?  11. NO - Have you had any abnormal blood loss such as black, tarry or bloody stools, or abnormal vaginal bleeding?  12. NO - Have you ever had a blood transfusion?  13. NO - Have you or any of your relatives ever had problems with anesthesia?  14. NO - Do you have sleep apnea, excessive snoring or daytime drowsiness?  15. NO - Do you have any prosthetic heart valves?  16. NO -  Do you have prosthetic joints?  17. NO - Is there any chance that you may be pregnant?      HPI:                                                      Brief HPI related to upcoming procedure:            This is a 57 year old male who comes in today for preoperative evaluation. 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  No personal history of penile cancer. Nonetheless, he came in last 5/31/2017 for consultation for removal of this penile mass. The patient is then scheduled for surgery on July 13, 2017.          MEDICAL HISTORY:                                                      Patient Active Problem List    Diagnosis Date Noted     Hyperlipidemia LDL goal <130 09/07/2016     Priority: Medium     Overweight (BMI 25.0-29.9) 07/14/2016     Priority: Medium      History reviewed. No pertinent past medical history.  History reviewed. No pertinent surgical history.  No current outpatient prescriptions on file.     OTC products: none    No Known Allergies   Latex Allergy: NO    Social History   Substance Use Topics     Smoking status: Never Smoker     Smokeless tobacco: Not on file     Alcohol use No     History   Drug Use No       REVIEW OF SYSTEMS:                                                    C: NEGATIVE for fever, chills, change in weight  I: NEGATIVE for worrisome rashes, moles or lesions  E: NEGATIVE for vision changes or irritation  E/M: NEGATIVE for ear, mouth and throat problems  R: NEGATIVE for significant cough or SOB  CV: NEGATIVE for chest pain, palpitations or peripheral edema  GI: NEGATIVE for nausea, abdominal pain, heartburn, or change in bowel habits  : NEGATIVE for frequency, dysuria, or hematuria  M: NEGATIVE for significant arthralgias or myalgia  N: NEGATIVE for weakness, dizziness or paresthesias  E: NEGATIVE for temperature  "intolerance, skin/hair changes. POSITIVE for prediabetes, not on any oral hypoglycemic agents.  H: NEGATIVE for bleeding problems  P: NEGATIVE for changes in mood or affect    EXAM:                                                    /81 (BP Location: Left arm, Patient Position: Chair, Cuff Size: Adult Regular)  Pulse 83  Temp 97.4  F (36.3  C) (Oral)  Ht 5' 3\" (1.6 m)  Wt 160 lb (72.6 kg)  SpO2 97%  BMI 28.34 kg/m2    GENERAL APPEARANCE: healthy, alert and no distress     EYES: EOMI,  PERRL     NECK: no adenopathy     RESP: lungs clear to auscultation - no rales, rhonchi or wheezes     CV: regular rates and rhythm, normal S1 S2, no S3 or S4 and no murmur, click or rub     MS: extremities normal- no gross deformities noted, no evidence of inflammation in joints, FROM in all extremities.     SKIN: no suspicious lesions or rashes     NEURO: Normal strength and tone, sensory exam grossly normal, mentation intact and speech normal     PSYCH: mentation appears normal. and affect normal/bright    DIAGNOSTICS:                                                    EKG: appears normal, NSR, nonspecific ST-T changes at leads V1 and V2. No baseline EKG for comparison.  WBC 7.5  4.0 - 11.0 10e9/L Final 07/06/2017  8:20 AM BK   RBC Count 4.97  4.4 - 5.9 10e12/L Final 07/06/2017  8:20 AM BK   Hemoglobin 15.4  13.3 - 17.7 g/dL Final 07/06/2017  8:20 AM BK   Hematocrit 45.4  40.0 - 53.0 % Final 07/06/2017  8:20 AM BK   MCV 91  78 - 100 fl Final 07/06/2017  8:20 AM BK   MCH 31.0  26.5 - 33.0 pg Final 07/06/2017  8:20 AM BK   MCHC 33.9  31.5 - 36.5 g/dL Final 07/06/2017  8:20 AM BK   RDW 12.7  10.0 - 15.0 % Final 07/06/2017  8:20 AM BK   Platelet Count 172  150 - 450 10e9/L Final 07/06/2017  8:20 AM BK   Diff Method     Final 07/06/2017  8:20 AM BK   Automated Method   % Neutrophils 51.1   % Final 07/06/2017  8:20 AM BK   % Lymphocytes 35.3   % Final 07/06/2017  8:20 AM BK   % Monocytes 10.0   % Final 07/06/2017  8:20 AM BK "   % Eosinophils 3.3   % Final 07/06/2017  8:20 AM BK   % Basophils 0.3   % Final 07/06/2017  8:20 AM BK   Absolute Neutrophil 3.8  1.6 - 8.3 10e9/L Final 07/06/2017  8:20 AM BK   Absolute Lymphocytes 2.7  0.8 - 5.3 10e9/L Final 07/06/2017  8:20 AM BK   Absolute Monocytes 0.8  0.0 - 1.3 10e9/L Final 07/06/2017  8:20 AM BK   Absolute Eosinophils 0.3  0.0 - 0.7 10e9/L Final 07/06/2017  8:20 AM BK   Absolute Basophils 0.0  0.0 - 0.2 10e9/L Final 07/06/2017  8:20 AM BK     Sodium 139  133 - 144 mmol/L Final 07/06/2017  8:20 AM MG   Potassium 4.4  3.4 - 5.3 mmol/L Final 07/06/2017  8:20 AM MG   Chloride 106  94 - 109 mmol/L Final 07/06/2017  8:20 AM MG   Carbon Dioxide 26  20 - 32 mmol/L Final 07/06/2017  8:20 AM MG   Anion Gap 7  3 - 14 mmol/L Final 07/06/2017  8:20 AM MG   Glucose 107 (H) 70 - 99 mg/dL Final 07/06/2017  8:20 AM MG   Comment:   Fasting specimen   Urea Nitrogen 24  7 - 30 mg/dL Final 07/06/2017  8:20 AM MG   Creatinine 0.92  0.66 - 1.25 mg/dL Final 07/06/2017  8:20 AM MG   GFR Estimate 86  >60 mL/min/1.7m2 Final 07/06/2017  8:20 AM MG   Comment:   Non  GFR Calc   GFR Estimate If Black   >60 mL/min/1.7m2 Final 07/06/2017  8:20 AM MG   >90   African American GFR Calc      Calcium 9.1  8.5 - 10.1 mg/dL Final 07/06/2017  8:20 AM MG   Bilirubin Total 0.7  0.2 - 1.3 mg/dL Final 07/06/2017  8:20 AM MG   Albumin 4.0  3.4 - 5.0 g/dL Final 07/06/2017  8:20 AM MG   Protein Total 8.4  6.8 - 8.8 g/dL Final 07/06/2017  8:20 AM MG   Alkaline Phosphatase 65  40 - 150 U/L Final 07/06/2017  8:20 AM MG   ALT 33  0 - 70 U/L Final 07/06/2017  8:20 AM MG   AST 21  0 - 45 U/L Final 07/06/2017  8:20 AM MG     INR 1.00  0.86 - 1.14  Final 07/06/2017  8:20 AM             IMPRESSION:                                                    Reason for surgery/procedure: Excision of penile mass  Diagnosis/reason for consult: Preoperative evaluation.    The proposed surgical procedure is considered LOW risk.    REVISED  CARDIAC RISK INDEX  The patient has the following serious cardiovascular risks for perioperative complications such as (MI, PE, VFib and 3  AV Block):  No serious cardiac risks  INTERPRETATION: 0 risks: Class I (very low risk - 0.4% complication rate)    The patient has the following additional risks for perioperative complications:  Abnormal electrocardiogram but patient is asymptomatic. NO baseline EKG for comparison. Resting transthoracic echocardiogram necessary to determine if there are wall motion abnormalities corresponding to non-specific ST-T changes at leads V1 and V2.      ICD-10-CM    1. Preop general physical exam Z01.818 EKG 12-lead complete w/read - Clinics       RECOMMENDATIONS:                                                              APPROVAL GIVEN to proceed with proposed procedure, but requires echocardiogram, as described above.       Signed Electronically by: Cirilo Goins MD    Copy of this evaluation report is provided to requesting physician.    [facility] Preop Guidelines

## 2017-07-06 NOTE — NURSING NOTE
"Chief Complaint   Patient presents with     Pre-Op Exam       Initial /81 (BP Location: Left arm, Patient Position: Chair, Cuff Size: Adult Regular)  Pulse 83  Temp 97.4  F (36.3  C) (Oral)  Ht 5' 3\" (1.6 m)  Wt 160 lb (72.6 kg)  SpO2 97%  BMI 28.34 kg/m2 Estimated body mass index is 28.34 kg/(m^2) as calculated from the following:    Height as of this encounter: 5' 3\" (1.6 m).    Weight as of this encounter: 160 lb (72.6 kg).  Medication Reconciliation: complete   ANTONIO Bajwa MA    EKG was done.  ANTONIO Bajwa MA    "

## 2017-07-06 NOTE — PATIENT INSTRUCTIONS
Before Your Surgery      Call your surgeon if there is any change in your health. This includes signs of a cold or flu (such as a sore throat, runny nose, cough, rash or fever).    Do not smoke, drink alcohol or take over the counter medicine (unless your surgeon or primary care doctor tells you to) for the 24 hours before and after surgery.    If you take prescribed drugs: Follow your doctor s orders about which medicines to take and which to stop until after surgery.    Eating and drinking prior to surgery: follow the instructions from your surgeon    Take a shower or bath the night before surgery. Use the soap your surgeon gave you to gently clean your skin. If you do not have soap from your surgeon, use your regular soap. Do not shave or scrub the surgery site.  Wear clean pajamas and have clean sheets on your bed.         ================================================================================  Normal Values   Blood pressure  <140/90 for most adults    <130/80 for some chronic diseases (ask your care team about yours)    BMI (body mass index)  18.5-25 kg/m2 (based on height and weight)     Thank you for visiting [facility]    Normal or non-critical lab and imaging results will be communicated to you by MyChart, letter or phone within 7 days.  If you do not hear from us within 10 days, please call the clinic. If you have a critical or abnormal lab result, we will notify you by phone as soon as possible.     If you have any questions regarding your visit please contact:     Team Comfort:   Clinic Hours Telephone Number   Dr. Zelalem Goins 7am-5pm  Monday - Friday [phone]  Ninoska Craig RN   Pharmacy 8:00am-8pm Monday-Friday    9am-5pm Saturday-Sunday [phone]   Urgent Care 11am-9pm Monday-Friday        9am-5pm Saturday-Sunday [phone]     After hours, weekend or if you need to make an  appointment with your primary provider please call [phone].   After Hours nurse advise: call [facility]: [phone]    Medication Refills:  Call your pharmacy and they will forward the refill to us. Please allow 3 business days for your refills to be completed.          Chest Echocardiography (Transthoracic)     During an echo, images of your heart appear on a monitor.   An echocardiogram (echo) is an imaging test.  A transthoracic echocardiogram is sometimes called by its abbreviation TTE. It may also be called surface echocardiogram because the images are non-invasive taken from the surface of the chest wall. It helps your healthcare provider evaluate your heart. A more invasive type of echocardiogram involves the ultrasound probe being passed into the esophagus to get images (transesophageal).     This test:    Is safe and generally painless. Some people have discomfort from the echo probe being pressed against the bony areas of the chest. This is relieves once the probe is moved.    Can be done in a hospital, test center, or doctor s office    Bounces harmless sound waves (ultrasound) off the heart using a transducer or probe (device that looks like a microphone)    Allows your healthcare provider evaluate the size and shape of your heart, and the size, thickness and movement of your heart's walls, and the heart's pumping strength.    Shows if the heart valves are working correctly, if blood is leaking backwards through your heart valves (regurgitation), or if the heart valves are to onarrow (stenosis)    Shows if there is a tumor or infectious growth around your heart valves    Will help your healthcare provider find out if there are problems with the outer lining of your heart (pericardium)    Shows problems with the large blood vessels that enter and leave the heart    Demonstrates blood clots in the heart chambers    Shows abnormal holes between heart chambers  Before your  echo    Discuss any questions or concerns you have with your healthcare provider.    Mention any over-the-counter or prescription medicines, herbs, or supplements you re taking.    Allow extra time for checking in. Bring your insurance cards, identification, and any co-payments that are required for the test.    Wear a 2-piece outfit for the test. You may be asked to remove clothing and jewelry from the waist up. If so, you ll be given a short hospital gown.    An intravenous catheter may be inserted into a vein in your arm or hand. Contrast or bubbles will be injected during the study.  During your echo    Small pads (electrodes) are placed on your chest to monitor your heartbeat.    A transducer coated with cool gel is moved firmly over your chest. This device creates the sound waves that make images of your heart. If you are overweight, the technician may have to apply more pressure to the chest wall to improve the quality of the images. This pressure can be uncomfortable over bony areas. Tell your technician if you are uncomfortable.    At times, you may be asked to exhale and hold your breath for a few seconds. Air in your lungs can affect the images.    The transducer may also be used to do a Doppler study. This test measures the direction and speed of blood flowing through the heart. During the test, you may hear a  whooshing  sound. This is the sound of blood flowing through the heart.    The technician may use IV contrast to improve the image quality or agitated saline may be used to follow blood flow through the chambers of the heart.    The images of your heart are stored electronically. This is so your healthcare provider can review them later.  After your echo    Return to normal activity unless your healthcare provider tells you otherwise.    Be sure to keep follow-up appointments.  Your test results  Your healthcare provider will discuss your test results with you during a future office visit. The  test results help the healthcare provider plan your treatment and any other tests that are needed.  Date Last Reviewed: 12/1/2016 2000-2017 [employer]. [address] [place] [address], [place] [address]. All rights reserved. This information is not intended as a substitute for professional medical care. Always follow your healthcare professional's instructions.

## 2017-07-07 ENCOUNTER — RADIANT APPOINTMENT (OUTPATIENT)
Dept: CARDIOLOGY | Facility: CLINIC | Age: 57
End: 2017-07-07
Attending: INTERNAL MEDICINE
Payer: COMMERCIAL

## 2017-07-07 DIAGNOSIS — R94.31 ABNORMAL ELECTROCARDIOGRAM: ICD-10-CM

## 2017-07-07 PROBLEM — R73.03 PREDIABETES: Status: ACTIVE | Noted: 2017-07-07

## 2017-07-07 LAB — CRP SERPL HS-MCNC: 0.5 MG/L

## 2017-07-07 PROCEDURE — 93306 TTE W/DOPPLER COMPLETE: CPT

## 2017-07-11 ENCOUNTER — TELEPHONE (OUTPATIENT)
Dept: FAMILY MEDICINE | Facility: CLINIC | Age: 57
End: 2017-07-11

## 2017-07-11 NOTE — TELEPHONE ENCOUNTER
Notes Recorded by Cirilo Goins MD on 7/10/2017 at 10:33 PM  Preoperative labs shows mildly elevated fasting glucose (not within diabetic range),  No anemia, normal platelet count and normal INR (low risk of excessive postoperative bleeding).  CRP cardiac risk score is below normal, indicating low risk of heart disease (consistent with unremarkable echocardiogram).  Patient is cleared for surgery).    (Call patient)    Patient notified of the messages.  Kiara Martinez RN

## 2017-07-11 NOTE — TELEPHONE ENCOUNTER
Notes Recorded by Jasson Lane on 7/11/2017 at 9:44 AM  Left message to return call. Telephone encounter created.  ------    Notes Recorded by Cirilo Goins MD on 7/10/2017 at 10:27 PM  Despite abnormal EKG, echocardiogram is unremarkable.  Therefore, patient is cleared for surgery.    (Call patient)

## 2017-07-12 ENCOUNTER — ANESTHESIA EVENT (OUTPATIENT)
Dept: SURGERY | Facility: AMBULATORY SURGERY CENTER | Age: 57
End: 2017-07-12

## 2017-07-13 ENCOUNTER — HOSPITAL ENCOUNTER (OUTPATIENT)
Facility: AMBULATORY SURGERY CENTER | Age: 57
Discharge: HOME | End: 2017-07-13
Attending: UROLOGY | Admitting: UROLOGY
Payer: COMMERCIAL

## 2017-07-13 ENCOUNTER — ANESTHESIA (OUTPATIENT)
Dept: SURGERY | Facility: AMBULATORY SURGERY CENTER | Age: 57
End: 2017-07-13

## 2017-07-13 ENCOUNTER — SURGERY (OUTPATIENT)
Age: 57
End: 2017-07-13

## 2017-07-13 VITALS
TEMPERATURE: 96.9 F | SYSTOLIC BLOOD PRESSURE: 139 MMHG | OXYGEN SATURATION: 96 % | DIASTOLIC BLOOD PRESSURE: 97 MMHG | RESPIRATION RATE: 12 BRPM

## 2017-07-13 DIAGNOSIS — N48.89 PENILE MASS: Primary | ICD-10-CM

## 2017-07-13 PROCEDURE — 11421 EXC H-F-NK-SP B9+MARG 0.6-1: CPT

## 2017-07-13 PROCEDURE — G8916 PT W IV AB GIVEN ON TIME: HCPCS

## 2017-07-13 PROCEDURE — 88305 TISSUE EXAM BY PATHOLOGIST: CPT | Performed by: UROLOGY

## 2017-07-13 PROCEDURE — 11426 EXC H-F-NK-SP B9+MARG >4 CM: CPT

## 2017-07-13 PROCEDURE — G8907 PT DOC NO EVENTS ON DISCHARG: HCPCS

## 2017-07-13 PROCEDURE — 11426 EXC H-F-NK-SP B9+MARG >4 CM: CPT | Performed by: UROLOGY

## 2017-07-13 RX ORDER — LIDOCAINE HYDROCHLORIDE 20 MG/ML
INJECTION, SOLUTION INFILTRATION; PERINEURAL PRN
Status: DISCONTINUED | OUTPATIENT
Start: 2017-07-13 | End: 2017-07-13

## 2017-07-13 RX ORDER — PHYSOSTIGMINE SALICYLATE 1 MG/ML
1.2 INJECTION INTRAVENOUS
Status: DISCONTINUED | OUTPATIENT
Start: 2017-07-13 | End: 2017-07-14 | Stop reason: HOSPADM

## 2017-07-13 RX ORDER — ONDANSETRON 2 MG/ML
4 INJECTION INTRAMUSCULAR; INTRAVENOUS EVERY 30 MIN PRN
Status: DISCONTINUED | OUTPATIENT
Start: 2017-07-13 | End: 2017-07-14 | Stop reason: HOSPADM

## 2017-07-13 RX ORDER — CEFAZOLIN SODIUM 2 G/100ML
2 INJECTION, SOLUTION INTRAVENOUS
Status: COMPLETED | OUTPATIENT
Start: 2017-07-13 | End: 2017-07-13

## 2017-07-13 RX ORDER — GABAPENTIN 300 MG/1
300 CAPSULE ORAL ONCE
Status: COMPLETED | OUTPATIENT
Start: 2017-07-13 | End: 2017-07-13

## 2017-07-13 RX ORDER — PROPOFOL 10 MG/ML
INJECTION, EMULSION INTRAVENOUS PRN
Status: DISCONTINUED | OUTPATIENT
Start: 2017-07-13 | End: 2017-07-13

## 2017-07-13 RX ORDER — SODIUM CHLORIDE, SODIUM LACTATE, POTASSIUM CHLORIDE, CALCIUM CHLORIDE 600; 310; 30; 20 MG/100ML; MG/100ML; MG/100ML; MG/100ML
INJECTION, SOLUTION INTRAVENOUS CONTINUOUS
Status: DISCONTINUED | OUTPATIENT
Start: 2017-07-13 | End: 2017-07-14 | Stop reason: HOSPADM

## 2017-07-13 RX ORDER — HYDROMORPHONE HYDROCHLORIDE 1 MG/ML
.3-.5 INJECTION, SOLUTION INTRAMUSCULAR; INTRAVENOUS; SUBCUTANEOUS EVERY 10 MIN PRN
Status: DISCONTINUED | OUTPATIENT
Start: 2017-07-13 | End: 2017-07-14 | Stop reason: HOSPADM

## 2017-07-13 RX ORDER — GABAPENTIN 300 MG/1
300 CAPSULE ORAL ONCE
Status: DISCONTINUED | OUTPATIENT
Start: 2017-07-13 | End: 2017-07-14 | Stop reason: HOSPADM

## 2017-07-13 RX ORDER — DEXAMETHASONE SODIUM PHOSPHATE 4 MG/ML
INJECTION, SOLUTION INTRA-ARTICULAR; INTRALESIONAL; INTRAMUSCULAR; INTRAVENOUS; SOFT TISSUE PRN
Status: DISCONTINUED | OUTPATIENT
Start: 2017-07-13 | End: 2017-07-13

## 2017-07-13 RX ORDER — LIDOCAINE 40 MG/G
CREAM TOPICAL
Status: DISCONTINUED | OUTPATIENT
Start: 2017-07-13 | End: 2017-07-14 | Stop reason: HOSPADM

## 2017-07-13 RX ORDER — MEPERIDINE HYDROCHLORIDE 25 MG/ML
12.5 INJECTION INTRAMUSCULAR; INTRAVENOUS; SUBCUTANEOUS
Status: DISCONTINUED | OUTPATIENT
Start: 2017-07-13 | End: 2017-07-14 | Stop reason: HOSPADM

## 2017-07-13 RX ORDER — FENTANYL CITRATE 50 UG/ML
25-50 INJECTION, SOLUTION INTRAMUSCULAR; INTRAVENOUS EVERY 5 MIN PRN
Status: DISCONTINUED | OUTPATIENT
Start: 2017-07-13 | End: 2017-07-14 | Stop reason: HOSPADM

## 2017-07-13 RX ORDER — NALOXONE HYDROCHLORIDE 0.4 MG/ML
.1-.4 INJECTION, SOLUTION INTRAMUSCULAR; INTRAVENOUS; SUBCUTANEOUS
Status: DISCONTINUED | OUTPATIENT
Start: 2017-07-13 | End: 2017-07-14 | Stop reason: HOSPADM

## 2017-07-13 RX ORDER — ALBUTEROL SULFATE 0.83 MG/ML
2.5 SOLUTION RESPIRATORY (INHALATION) EVERY 4 HOURS PRN
Status: DISCONTINUED | OUTPATIENT
Start: 2017-07-13 | End: 2017-07-14 | Stop reason: HOSPADM

## 2017-07-13 RX ORDER — ACETAMINOPHEN 325 MG/1
975 TABLET ORAL ONCE
Status: DISCONTINUED | OUTPATIENT
Start: 2017-07-13 | End: 2017-07-14 | Stop reason: HOSPADM

## 2017-07-13 RX ORDER — OXYCODONE HYDROCHLORIDE 5 MG/1
5 TABLET ORAL EVERY 4 HOURS PRN
Status: DISCONTINUED | OUTPATIENT
Start: 2017-07-13 | End: 2017-07-14 | Stop reason: HOSPADM

## 2017-07-13 RX ORDER — FENTANYL CITRATE 50 UG/ML
INJECTION, SOLUTION INTRAMUSCULAR; INTRAVENOUS PRN
Status: DISCONTINUED | OUTPATIENT
Start: 2017-07-13 | End: 2017-07-13

## 2017-07-13 RX ORDER — ONDANSETRON 4 MG/1
4 TABLET, ORALLY DISINTEGRATING ORAL EVERY 30 MIN PRN
Status: DISCONTINUED | OUTPATIENT
Start: 2017-07-13 | End: 2017-07-14 | Stop reason: HOSPADM

## 2017-07-13 RX ORDER — ONDANSETRON 2 MG/ML
INJECTION INTRAMUSCULAR; INTRAVENOUS PRN
Status: DISCONTINUED | OUTPATIENT
Start: 2017-07-13 | End: 2017-07-13

## 2017-07-13 RX ORDER — ACETAMINOPHEN 325 MG/1
975 TABLET ORAL ONCE
Status: COMPLETED | OUTPATIENT
Start: 2017-07-13 | End: 2017-07-13

## 2017-07-13 RX ORDER — FENTANYL CITRATE 50 UG/ML
25-50 INJECTION, SOLUTION INTRAMUSCULAR; INTRAVENOUS
Status: DISCONTINUED | OUTPATIENT
Start: 2017-07-13 | End: 2017-07-14 | Stop reason: HOSPADM

## 2017-07-13 RX ORDER — DEXAMETHASONE SODIUM PHOSPHATE 4 MG/ML
4 INJECTION, SOLUTION INTRA-ARTICULAR; INTRALESIONAL; INTRAMUSCULAR; INTRAVENOUS; SOFT TISSUE EVERY 10 MIN PRN
Status: DISCONTINUED | OUTPATIENT
Start: 2017-07-13 | End: 2017-07-14 | Stop reason: HOSPADM

## 2017-07-13 RX ORDER — BUPIVACAINE HYDROCHLORIDE 2.5 MG/ML
INJECTION, SOLUTION INFILTRATION; PERINEURAL PRN
Status: DISCONTINUED | OUTPATIENT
Start: 2017-07-13 | End: 2017-07-13 | Stop reason: HOSPADM

## 2017-07-13 RX ORDER — METOPROLOL TARTRATE 1 MG/ML
1-2 INJECTION, SOLUTION INTRAVENOUS EVERY 5 MIN PRN
Status: DISCONTINUED | OUTPATIENT
Start: 2017-07-13 | End: 2017-07-14 | Stop reason: HOSPADM

## 2017-07-13 RX ORDER — GINSENG 100 MG
CAPSULE ORAL PRN
Status: DISCONTINUED | OUTPATIENT
Start: 2017-07-13 | End: 2017-07-13 | Stop reason: HOSPADM

## 2017-07-13 RX ORDER — HYDRALAZINE HYDROCHLORIDE 20 MG/ML
2.5-5 INJECTION INTRAMUSCULAR; INTRAVENOUS EVERY 10 MIN PRN
Status: DISCONTINUED | OUTPATIENT
Start: 2017-07-13 | End: 2017-07-14 | Stop reason: HOSPADM

## 2017-07-13 RX ORDER — HYDROCODONE BITARTRATE AND ACETAMINOPHEN 5; 325 MG/1; MG/1
1-2 TABLET ORAL EVERY 4 HOURS PRN
Qty: 30 TABLET | Refills: 0 | Status: SHIPPED | OUTPATIENT
Start: 2017-07-13

## 2017-07-13 RX ADMIN — CEFAZOLIN SODIUM 2 G: 2 INJECTION, SOLUTION INTRAVENOUS at 12:24

## 2017-07-13 RX ADMIN — ONDANSETRON 4 MG: 2 INJECTION INTRAMUSCULAR; INTRAVENOUS at 12:22

## 2017-07-13 RX ADMIN — PROPOFOL 170 MG: 10 INJECTION, EMULSION INTRAVENOUS at 12:17

## 2017-07-13 RX ADMIN — SODIUM CHLORIDE, SODIUM LACTATE, POTASSIUM CHLORIDE, CALCIUM CHLORIDE: 600; 310; 30; 20 INJECTION, SOLUTION INTRAVENOUS at 11:40

## 2017-07-13 RX ADMIN — Medication 250 UNITS: at 13:06

## 2017-07-13 RX ADMIN — GABAPENTIN 300 MG: 300 CAPSULE ORAL at 11:20

## 2017-07-13 RX ADMIN — FENTANYL CITRATE 50 MCG: 50 INJECTION, SOLUTION INTRAMUSCULAR; INTRAVENOUS at 12:19

## 2017-07-13 RX ADMIN — LIDOCAINE HYDROCHLORIDE 40 MG: 20 INJECTION, SOLUTION INFILTRATION; PERINEURAL at 12:17

## 2017-07-13 RX ADMIN — FENTANYL CITRATE 50 MCG: 50 INJECTION, SOLUTION INTRAMUSCULAR; INTRAVENOUS at 12:30

## 2017-07-13 RX ADMIN — BUPIVACAINE HYDROCHLORIDE 15 ML: 2.5 INJECTION, SOLUTION INFILTRATION; PERINEURAL at 13:08

## 2017-07-13 RX ADMIN — ACETAMINOPHEN 975 MG: 325 TABLET ORAL at 11:20

## 2017-07-13 RX ADMIN — DEXAMETHASONE SODIUM PHOSPHATE 4 MG: 4 INJECTION, SOLUTION INTRA-ARTICULAR; INTRALESIONAL; INTRAMUSCULAR; INTRAVENOUS; SOFT TISSUE at 12:22

## 2017-07-13 NOTE — ANESTHESIA PREPROCEDURE EVALUATION
Anesthesia Evaluation     . Pt has not had prior anesthetic            ROS/MED HX    ENT/Pulmonary:  - neg pulmonary ROS     Neurologic:  - neg neurologic ROS     Cardiovascular:  - neg cardiovascular ROS       METS/Exercise Tolerance:     Hematologic:  - neg hematologic  ROS       Musculoskeletal:  - neg musculoskeletal ROS       GI/Hepatic:         Renal/Genitourinary: Comment: Penile lesion        Endo:  - neg endo ROS       Psychiatric:  - neg psychiatric ROS       Infectious Disease:  - neg infectious disease ROS       Malignancy:      - no malignancy   Other:    - neg other ROS                 Physical Exam  Normal systems: cardiovascular, pulmonary and dental    Airway   Mallampati: I  TM distance: >3 FB  Neck ROM: full    Dental     Cardiovascular       Pulmonary    breath sounds clear to auscultation                    Anesthesia Plan      History & Physical Review  History and physical reviewed and following examination; no interval change.    ASA Status:  1 .    NPO Status:  > 6 hours    Plan for General and LMA with Intravenous and Propofol induction. Maintenance will be Balanced.    PONV prophylaxis:  Ondansetron (or other 5HT-3) and Dexamethasone or Solumedrol       Postoperative Care  Postoperative pain management:  Multi-modal analgesia.      Consents                          .

## 2017-07-13 NOTE — IP AVS SNAPSHOT
MRN:[id number]                      After Visit Summary   7/13/2017    Rajesh Sheridan    MRN: [id number]           Thank you!     Thank you for choosing [facility] for your care. Our goal is always to provide you with excellent care. Hearing back from our patients is one way we can continue to improve our services. Please take a few minutes to complete the written survey that you may receive in the mail after you visit with us. Thank you!        Patient Information     Date Of Birth          1960        About your hospital stay     You were admitted on:  July 13, 2017 You last received care in the:  [facility] [place] [place]    You were discharged on:  July 13, 2017       Who to Call     For medical emergencies, please call 911.  For non-urgent questions about your medical care, please call your primary care provider or clinic, [phone]  For questions related to your surgery, please call your surgery clinic        Attending Provider     Provider Khurram Roa MD Urology       Primary Care Provider Office Phone # Fax #    Cirilo Goins [phone]      After Care Instructions     Diet Instructions       Resume pre procedure diet            Discharge Instructions       Patient to arrange for follow up appointment in 4 weeks            Discharge Instructions       Patient to apply bacitracin to incision 2x per day  No sexual activities until wound healed completely.            No Alcohol       for 24 hours post procedure            No driving or operating machinery        until the day after procedure                  Further instructions from your care team       [facility]  Same-Day Surgery   Adult Discharge Orders & Instructions   For 24 hours after surgery  1. Get plenty of rest.  A responsible adult must stay with you for at least 24 hours after you leave the hospital.   2. Do not drive or use heavy equipment.  If you have  "weakness or tingling, don't drive or use heavy equipment until this feeling goes away.  3. Do not drink alcohol.  4. Avoid strenuous or risky activities.  Ask for help when climbing stairs.   5. You may feel lightheaded.  IF so, sit for a few minutes before standing.  Have someone help you get up.   6. If you have nausea (feel sick to your stomach): Drink only clear liquids such as apple juice, ginger ale, broth or 7-Up.  Rest may also help.  Be sure to drink enough fluids.  Move to a regular diet as you feel able.  7. You may have a slight fever. Call the doctor if your fever is over 100 F (37.7 C) (taken under the tongue) or lasts longer than 24 hours.  8. You may have a dry mouth, a sore throat, muscle aches or trouble sleeping.  These should go away after 24 hours.  9. Do not make important or legal decisions.   Call your doctor for any of the followin.  Signs of infection (fever, growing tenderness at the surgery site, a large amount of drainage or bleeding, severe pain, foul-smelling drainage, redness, swelling).    2. It has been over 8 to 10 hours since surgery and you are still not able to urinate (pass water).    3.  Headache for over 24 hours.  To contact a doctor call:  [phone]      Pending Results     No orders found from 2017 to 2017.            Admission Information     Date & Time Provider Department Dept. Phone    2017 Khurram Haddad MD [facility] [place] [phone]      Your Vitals Were     Blood Pressure Temperature Respirations Pulse Oximetry          131/80 96.9  F (36.1  C) (Temporal) 18 100%        MyChart Information     [employer] lets you send messages to your doctor, view your test results, renew your prescriptions, schedule appointments and more. To sign up, go to [email or web address] . Click on \"Log in\" on the left side of the screen, which will take you to the Welcome page. Then click on \"Sign up Now\" on the right side of the page.     You will " be asked to enter the access code listed below, as well as some personal information. Please follow the directions to create your username and password.     Your access code is: [email or web address]  Expires: 2017  9:33 AM     Your access code will  in 90 days. If you need help or a new code, please call your [facility] clinic or [phone].        Care EveryWhere ID     This is your Care EveryWhere ID. This could be used by other organizations to access your [facility] medical records  [id number]        Equal Access to Services     [facility]: Hadii aad charles hadasho Soomaali, waaxda luqadaha, qaybta kaalmada adeegyayamel, chelly salgado . So [facility] [phone].    ATENCIÓN: Si habla español, tiene a ye disposición servicios gratuitos de asistencia lingüística. Zuri al [phone].    We comply with applicable federal civil rights laws and [place] laws. We do not discriminate on the basis of race, color, national origin, age, disability sex, sexual orientation or gender identity.               Review of your medicines      START taking        Dose / Directions    HYDROcodone-acetaminophen 5-325 MG per tablet   Commonly known as:  NORCO   Used for:  Penile mass        Dose:  1-2 tablet   Take 1-2 tablets by mouth every 4 hours as needed for moderate to severe pain (Moderate to Severe Pain)   Quantity:  30 tablet   Refills:  0            Where to get your medicines      Some of these will need a paper prescription and others can be bought over the counter. Ask your nurse if you have questions.     Bring a paper prescription for each of these medications     HYDROcodone-acetaminophen 5-325 MG per tablet                Protect others around you: Learn how to safely use, store and throw away your medicines at www.disposemymeds.org.             Medication List: This is a list of all your medications and when to take them. Check marks below indicate your daily home schedule. Keep this list as a  reference.      Medications           Morning Afternoon Evening Bedtime As Needed    HYDROcodone-acetaminophen 5-325 MG per tablet   Commonly known as:  NORCO   Take 1-2 tablets by mouth every 4 hours as needed for moderate to severe pain (Moderate to Severe Pain)

## 2017-07-13 NOTE — OP NOTE
Pre op dx: penile mass  Post op dx: same  Procedure: excision of penile mass > 5 cm    Procedure:  Patient was brought to the OR and placed supine.  General anesthesia induced.  He was draped and prepped sterilely.  Pre op antibiotics given.  0.25% marcaine used to perform penile block.  Ledesma catheter placed so that I can identified his urethra.  He has a large penile mass measuring over 5 cm ventrally.  This area was marked.  Using both sharp and blunt dissection, the mass was excised.  The penis and urethra was not injured.  The mass was subcutaneous.  2.0 Chromic suture was then used to re-approximated the skin edges.  There is minimal bleeding.  He tolerated the procedure well.

## 2017-07-13 NOTE — ANESTHESIA POSTPROCEDURE EVALUATION
Patient: True Fatimah    Procedure(s):  Excise penile mass - Wound Class: I-Clean    Diagnosis:penile mass  Diagnosis Additional Information: No value filed.    Anesthesia Type:  General, LMA    Note:  Anesthesia Post Evaluation    Patient location during evaluation: PACU  Patient participation: Able to fully participate in evaluation  Level of consciousness: awake  Pain management: adequate  Airway patency: patent  Cardiovascular status: acceptable  Respiratory status: acceptable  Hydration status: acceptable  PONV: none     Anesthetic complications: None    Comments: Stable recovery noted.        Last vitals:  Vitals:    07/13/17 1128 07/13/17 1318 07/13/17 1320   BP: 134/81 131/80 137/90   Resp: 18 18 18   Temp: 98.5  F (36.9  C) 96.9  F (36.1  C)    SpO2: 98% 100% 99%         Electronically Signed By: Jaime Jha MD  July 13, 2017  1:36 PM

## 2017-07-13 NOTE — BRIEF OP NOTE
[facility]  Brief Operative Note    Pre-operative diagnosis: Penile mass   Post-operative diagnosis same   Procedure: Procedure(s):  EXCISE MASS penile   Surgeon: Khurram Haddad MD   Assistants(s): None   Anesthesia: General    Estimated blood loss: minimal                   Specimens: sent   Implants: None       Complications: None   Condition on discharge: Stable   Comments: See dictated operative report for full details

## 2017-07-13 NOTE — IP AVS SNAPSHOT
[facility]    [address] [place] [address]    Phone:  [phone]                                       After Visit Summary   7/13/2017    Rajesh Sheridan    MRN: [id number]           After Visit Summary Signature Page     I have received my discharge instructions, and my questions have been answered. I have discussed any challenges I see with this plan with the nurse or doctor.    ..........................................................................................................................................  Patient/Patient Representative Signature      ..........................................................................................................................................  Patient Representative Print Name and Relationship to Patient    ..................................................               ................................................  Date                                            Time    ..........................................................................................................................................  Reviewed by Signature/Title    ...................................................              ..............................................  Date                                                            Time

## 2017-07-13 NOTE — ANESTHESIA CARE TRANSFER NOTE
Patient: True Fatimah    Procedure(s):  Excise penile mass - Wound Class: I-Clean    Diagnosis: penile mass  Diagnosis Additional Information: No value filed.    Anesthesia Type:   General, LMA     Note:  Airway :Room Air  Patient transferred to:PACU        Vitals: (Last set prior to Anesthesia Care Transfer)    CRNA VITALS  7/13/2017 1244 - 7/13/2017 1318      7/13/2017             Pulse: 82    SpO2: 100 %    Resp Rate (observed): 13                Electronically Signed By: JULIANA Ortega CRNA  July 13, 2017  1:18 PM

## 2017-07-19 LAB — COPATH REPORT: NORMAL

## 2017-08-01 ENCOUNTER — TELEPHONE (OUTPATIENT)
Dept: UROLOGY | Facility: CLINIC | Age: 57
End: 2017-08-01

## 2017-08-01 NOTE — TELEPHONE ENCOUNTER
Called and spoke to patient. Patient states that incision is not healing.  Patient denies redness, warmth, swelling and drainage.  Patient states that he wants the provider to look at it.  Offered same day appointment patient declined.  Follow up scheduled 8/4/2017.  Attempted to reassure patient. Ama Muñoz RN

## 2017-08-01 NOTE — TELEPHONE ENCOUNTER
Reason for Call:  Other appointment    Detailed comments: patient calling. He had surgery on July 13. He thinks he has an infection and would like it to be checked. Please call him.     Phone Number Patient can be reached at: Home number on file [phone] (home)    Best Time:  Any     Can we leave a detailed message on this number? YES    Call taken on 8/1/2017 at 9:24 AM by Dayna Cole

## 2017-08-04 ENCOUNTER — OFFICE VISIT (OUTPATIENT)
Dept: UROLOGY | Facility: CLINIC | Age: 57
End: 2017-08-04
Payer: COMMERCIAL

## 2017-08-04 VITALS — HEART RATE: 72 BPM | SYSTOLIC BLOOD PRESSURE: 114 MMHG | DIASTOLIC BLOOD PRESSURE: 80 MMHG | RESPIRATION RATE: 14 BRPM

## 2017-08-04 DIAGNOSIS — Z98.890 POSTOPERATIVE STATE: Primary | ICD-10-CM

## 2017-08-04 PROCEDURE — 99024 POSTOP FOLLOW-UP VISIT: CPT | Performed by: UROLOGY

## 2017-08-04 NOTE — MR AVS SNAPSHOT
"              After Visit Summary   2017    Rajesh Sheridan    MRN: [id number]           Patient Information     Date Of Birth          1960        Visit Information        Provider Department      2017 8:00 AM Khurram Haddad MD [facility]        Today's Diagnoses     Postoperative state    -  1       Follow-ups after your visit        Who to contact     If you have questions or need follow up information about today's clinic visit or your schedule please contact [facility] directly at [phone].  Normal or non-critical lab and imaging results will be communicated to you by MyChart, letter or phone within 4 business days after the clinic has received the results. If you do not hear from us within 7 days, please contact the clinic through [employer]miohart or phone. If you have a critical or abnormal lab result, we will notify you by phone as soon as possible.  Submit refill requests through [employer] or call your pharmacy and they will forward the refill request to us. Please allow 3 business days for your refill to be completed.          Additional Information About Your Visit        MyChart Information     [employer] lets you send messages to your doctor, view your test results, renew your prescriptions, schedule appointments and more. To sign up, go to [email or web address] . Click on \"Log in\" on the left side of the screen, which will take you to the Welcome page. Then click on \"Sign up Now\" on the right side of the page.     You will be asked to enter the access code listed below, as well as some personal information. Please follow the directions to create your username and password.     Your access code is: [email or web address]  Expires: 2017  9:33 AM     Your access code will  in 90 days. If you need help or a new code, please call your [facility] or [phone].        Care EveryWhere ID     This is your Care EveryWhere ID. This could be used by other organizations to " access your [facility] medical records  [id number]        Your Vitals Were     Pulse Respirations                72 14           Blood Pressure from Last 3 Encounters:   08/04/17 114/80   07/13/17 (!) 139/97   07/06/17 122/81    Weight from Last 3 Encounters:   07/06/17 72.6 kg (160 lb)   05/31/17 72.6 kg (160 lb)   07/14/16 72.6 kg (160 lb)              Today, you had the following     No orders found for display       Primary Care Provider Office Phone # Fax #    Cirilo Goins [phone]       [facility] [address]  [facility] [address]        Equal Access to Services     ALEXA LESLIE : Hadii aad ku hadasho Soomaali, waaxda luqadaha, qaybta kaalmada adeegyada, waxay idiin hayrajeev carrington kharashamar salgado . So [facility] [phone].    ATENCIÓN: Si habla español, tiene a ye disposición servicios gratuitos de asistencia lingüística. [facility] [phone].    We comply with applicable federal civil rights laws and [place] laws. We do not discriminate on the basis of race, color, national origin, age, disability sex, sexual orientation or gender identity.            Thank you!     Thank you for choosing [facility]  for your care. Our goal is always to provide you with excellent care. Hearing back from our patients is one way we can continue to improve our services. Please take a few minutes to complete the written survey that you may receive in the mail after your visit with us. Thank you!             Your Updated Medication List - Protect others around you: Learn how to safely use, store and throw away your medicines at www.disposemymeds.org.          This list is accurate as of: 8/4/17  8:04 AM.  Always use your most recent med list.                   Brand Name Dispense Instructions for use Diagnosis    HYDROcodone-acetaminophen 5-325 MG per tablet    NORCO    30 tablet    Take 1-2 tablets by mouth every 4 hours as needed for moderate to severe pain  (Moderate to Severe Pain)    Penile mass

## 2017-08-04 NOTE — PROGRESS NOTES
Chief Complaint   Patient presents with     Surgical Followup       Rajesh Sheridan is a 57 year old male who presents today for follow up of   Chief Complaint   Patient presents with     Surgical Followup    f/u post penile mass excision.      Current Outpatient Prescriptions   Medication Sig Dispense Refill     HYDROcodone-acetaminophen (NORCO) 5-325 MG per tablet Take 1-2 tablets by mouth every 4 hours as needed for moderate to severe pain (Moderate to Severe Pain) (Patient not taking: Reported on 8/4/2017) 30 tablet 0     No Known Allergies   No past medical history on file.  Past Surgical History:   Procedure Laterality Date     EXCISE MASS GROIN N/A 7/13/2017    Procedure: EXCISE MASS GROIN;  Excise penile mass;  Surgeon: Khurram Haddad MD;  Location: [facility] OR     No family history on file.  Social History     Social History     Marital status:      Spouse name: N/A     Number of children: N/A     Years of education: N/A     Social History Main Topics     Smoking status: Never Smoker     Smokeless tobacco: Never Used     Alcohol use No     Drug use: No     Sexual activity: Yes     Partners: Female     Other Topics Concern     None     Social History Narrative       REVIEW OF SYSTEMS  =================  C: NEGATIVE for fever, chills, change in weight  I: NEGATIVE for worrisome rashes, moles or lesions  E/M: NEGATIVE for ear, mouth and throat problems  R: NEGATIVE for significant cough or SHORTNESS OF BREATH,   CV: NEGATIVE for chest pain, palpitations or peripheral edema  GI: NEGATIVE for nausea, abdominal pain, heartburn, or change in bowel habits  NEURO: NEGATIVE any motor/sensory changes  PSYCH: NEGATIVE for recent mood disorder    Physical Exam:  /80 (BP Location: Right arm, Patient Position: Chair, Cuff Size: Adult Regular)  Pulse 72  Resp 14   Patient is pleasant, in no acute distress, good general condition.  Lung: no evidence of respiratory distress    Abdomen: Soft, nondistended, non tender.  No masses. No rebound or guarding.   Exam: incision c/d/i.    Skin: Warm and dry.  No redness.  Psych: normal mood and affect  Neuro: alert and oriented    Assessment/Plan:   (Z98.890) Postoperative state  (primary encounter diagnosis)  Comment: healing well  Plan: f/u as needed.

## 2017-08-04 NOTE — NURSING NOTE
"Chief Complaint   Patient presents with     Surgical Followup       Initial /80 (BP Location: Right arm, Patient Position: Chair, Cuff Size: Adult Regular)  Pulse 72  Resp 14 Estimated body mass index is 28.34 kg/(m^2) as calculated from the following:    Height as of 7/6/17: 1.6 m (5' 3\").    Weight as of 7/6/17: 72.6 kg (160 lb).  Medication Reconciliation: complete   Ericka Juarez CMA      "

## 2018-07-19 ENCOUNTER — TELEPHONE (OUTPATIENT)
Dept: FAMILY MEDICINE | Facility: CLINIC | Age: 58
End: 2018-07-19

## 2018-07-19 NOTE — LETTER
July 19, 2018      Rajesh Sheridan  [address]   [facility] [address]          Dear Rajesh Sheridan,      At [facility] we care about your health and are committed to providing quality patient care, which includes staying current on preventative cancer screenings.  You can increase your chances of finding and treating cancers through regular screenings.      Our records show that you are due for the following screening(s):    Colonoscopy for colon cancer  Specialty Schedule Number [phone]  Recommended every ten years for everyone age 50 and older  We strongly urge our patient's to consider having a colonoscopy done, which is the best screening test available and only needs to be done every 10 years if normal.      Other option is that you can do a FIT and this is once a year.  Any questions or concern, please contact us at [facility] at [phone].    You may contact the closest location to schedule the screening test(s) at your earliest convenience.    If you have already had one or all of the above screening tests at another facility, please call us so that we may update your chart.      Sincerely,    Quality Committee at [facility]/ally

## 2018-07-19 NOTE — TELEPHONE ENCOUNTER
Panel Management Review      BP Readings from Last 1 Encounters:   08/04/17 114/80    , No results found for: A1C, Visit date not found  Last Office Visit with this department: Visit date not found    Fail List measure: colon cancer       Patient is due/failing the following:   COLONOSCOPY or FIT    Action needed:   none    Type of outreach:    Sent letter.    Questions for provider review:    None                                                                                                                                    [facility]      Chart routed to n/a .
